# Patient Record
Sex: FEMALE | Race: BLACK OR AFRICAN AMERICAN | NOT HISPANIC OR LATINO
[De-identification: names, ages, dates, MRNs, and addresses within clinical notes are randomized per-mention and may not be internally consistent; named-entity substitution may affect disease eponyms.]

---

## 2023-10-25 DIAGNOSIS — Z00.00 ENCOUNTER FOR GENERAL ADULT MEDICAL EXAMINATION W/OUT ABNORMAL FINDINGS: ICD-10-CM

## 2023-10-27 ENCOUNTER — APPOINTMENT (OUTPATIENT)
Dept: ORTHOPEDIC SURGERY | Facility: CLINIC | Age: 58
End: 2023-10-27
Payer: COMMERCIAL

## 2023-10-27 VITALS — HEIGHT: 60 IN | BODY MASS INDEX: 45.55 KG/M2 | WEIGHT: 232 LBS

## 2023-10-27 DIAGNOSIS — M17.0 BILATERAL PRIMARY OSTEOARTHRITIS OF KNEE: ICD-10-CM

## 2023-10-27 DIAGNOSIS — M16.0 BILATERAL PRIMARY OSTEOARTHRITIS OF HIP: ICD-10-CM

## 2023-10-27 PROCEDURE — 99204 OFFICE O/P NEW MOD 45 MIN: CPT

## 2023-10-27 PROCEDURE — 73522 X-RAY EXAM HIPS BI 3-4 VIEWS: CPT

## 2023-10-27 PROCEDURE — 73564 X-RAY EXAM KNEE 4 OR MORE: CPT | Mod: LT

## 2023-10-27 RX ORDER — MELOXICAM 15 MG/1
15 TABLET ORAL
Qty: 30 | Refills: 2 | Status: ACTIVE | COMMUNITY
Start: 2023-10-27 | End: 1900-01-01

## 2024-01-09 ENCOUNTER — APPOINTMENT (OUTPATIENT)
Dept: ORTHOPEDIC SURGERY | Facility: CLINIC | Age: 59
End: 2024-01-09
Payer: COMMERCIAL

## 2024-01-09 ENCOUNTER — NON-APPOINTMENT (OUTPATIENT)
Age: 59
End: 2024-01-09

## 2024-01-09 VITALS
BODY MASS INDEX: 35.63 KG/M2 | DIASTOLIC BLOOD PRESSURE: 76 MMHG | SYSTOLIC BLOOD PRESSURE: 108 MMHG | HEART RATE: 69 BPM | HEIGHT: 67 IN | WEIGHT: 227 LBS

## 2024-01-09 DIAGNOSIS — M16.11 UNILATERAL PRIMARY OSTEOARTHRITIS, RIGHT HIP: ICD-10-CM

## 2024-01-09 PROCEDURE — 99215 OFFICE O/P EST HI 40 MIN: CPT

## 2024-01-09 PROCEDURE — 73502 X-RAY EXAM HIP UNI 2-3 VIEWS: CPT | Mod: RT

## 2024-01-09 PROCEDURE — 73564 X-RAY EXAM KNEE 4 OR MORE: CPT | Mod: RT

## 2024-02-22 ASSESSMENT — HOOS JR
HOOS JR RAW SCORE: 16
HOOS JR RAW SCORE: 17
LYING IN BED (TURNING OVER, MAINTAINING HIP POSITION): SEVERE
SITTING: MODERATE
RISING FROM SITTING: MODERATE
BENDING TO THE FLOOR TO PICK UP OBJECT: EXTREME
LYING IN BED (TURNING OVER, MAINTAINING HIP POSITION): SEVERE
BENDING TO THE FLOOR TO PICK UP OBJECT: EXTREME
WALKING ON UNEVEN SURFACE: MODERATE
SITTING: MODERATE
GOING UP OR DOWN STAIRS: SEVERE
RISING FROM SITTING: MODERATE
WALKING ON UNEVEN SURFACE: SEVERE

## 2024-03-12 ENCOUNTER — OUTPATIENT (OUTPATIENT)
Dept: OUTPATIENT SERVICES | Facility: HOSPITAL | Age: 59
LOS: 1 days | End: 2024-03-12
Payer: COMMERCIAL

## 2024-03-12 VITALS
WEIGHT: 228.18 LBS | SYSTOLIC BLOOD PRESSURE: 110 MMHG | HEART RATE: 66 BPM | HEIGHT: 67 IN | OXYGEN SATURATION: 100 % | DIASTOLIC BLOOD PRESSURE: 68 MMHG | TEMPERATURE: 98 F

## 2024-03-12 DIAGNOSIS — Z90.711 ACQUIRED ABSENCE OF UTERUS WITH REMAINING CERVICAL STUMP: Chronic | ICD-10-CM

## 2024-03-12 DIAGNOSIS — Z98.890 OTHER SPECIFIED POSTPROCEDURAL STATES: Chronic | ICD-10-CM

## 2024-03-12 DIAGNOSIS — M16.11 UNILATERAL PRIMARY OSTEOARTHRITIS, RIGHT HIP: ICD-10-CM

## 2024-03-12 DIAGNOSIS — Z01.818 ENCOUNTER FOR OTHER PREPROCEDURAL EXAMINATION: ICD-10-CM

## 2024-03-12 LAB
A1C WITH ESTIMATED AVERAGE GLUCOSE RESULT: 4.6 % — SIGNIFICANT CHANGE UP (ref 4–5.6)
ALBUMIN SERPL ELPH-MCNC: 4.2 G/DL — SIGNIFICANT CHANGE UP (ref 3.3–5)
ALP SERPL-CCNC: 89 U/L — SIGNIFICANT CHANGE UP (ref 30–120)
ALT FLD-CCNC: 15 U/L — SIGNIFICANT CHANGE UP (ref 10–60)
ANION GAP SERPL CALC-SCNC: 9 MMOL/L — SIGNIFICANT CHANGE UP (ref 5–17)
APTT BLD: 34.4 SEC — SIGNIFICANT CHANGE UP (ref 24.5–35.6)
AST SERPL-CCNC: 13 U/L — SIGNIFICANT CHANGE UP (ref 10–40)
BILIRUB SERPL-MCNC: 1 MG/DL — SIGNIFICANT CHANGE UP (ref 0.2–1.2)
BLD GP AB SCN SERPL QL: SIGNIFICANT CHANGE UP
BUN SERPL-MCNC: 19 MG/DL — SIGNIFICANT CHANGE UP (ref 7–23)
CALCIUM SERPL-MCNC: 10.1 MG/DL — SIGNIFICANT CHANGE UP (ref 8.4–10.5)
CHLORIDE SERPL-SCNC: 104 MMOL/L — SIGNIFICANT CHANGE UP (ref 96–108)
CO2 SERPL-SCNC: 27 MMOL/L — SIGNIFICANT CHANGE UP (ref 22–31)
CREAT SERPL-MCNC: 0.87 MG/DL — SIGNIFICANT CHANGE UP (ref 0.5–1.3)
EGFR: 78 ML/MIN/1.73M2 — SIGNIFICANT CHANGE UP
ESTIMATED AVERAGE GLUCOSE: 85 MG/DL — SIGNIFICANT CHANGE UP (ref 68–114)
GLUCOSE SERPL-MCNC: 105 MG/DL — HIGH (ref 70–99)
HCT VFR BLD CALC: 42.2 % — SIGNIFICANT CHANGE UP (ref 34.5–45)
HGB BLD-MCNC: 13.5 G/DL — SIGNIFICANT CHANGE UP (ref 11.5–15.5)
INR BLD: 1.18 RATIO — SIGNIFICANT CHANGE UP (ref 0.85–1.18)
MCHC RBC-ENTMCNC: 29.1 PG — SIGNIFICANT CHANGE UP (ref 27–34)
MCHC RBC-ENTMCNC: 32 GM/DL — SIGNIFICANT CHANGE UP (ref 32–36)
MCV RBC AUTO: 90.9 FL — SIGNIFICANT CHANGE UP (ref 80–100)
MRSA PCR RESULT.: SIGNIFICANT CHANGE UP
NRBC # BLD: 0 /100 WBCS — SIGNIFICANT CHANGE UP (ref 0–0)
PLATELET # BLD AUTO: 236 K/UL — SIGNIFICANT CHANGE UP (ref 150–400)
POTASSIUM SERPL-MCNC: 4.5 MMOL/L — SIGNIFICANT CHANGE UP (ref 3.5–5.3)
POTASSIUM SERPL-SCNC: 4.5 MMOL/L — SIGNIFICANT CHANGE UP (ref 3.5–5.3)
PROT SERPL-MCNC: 8.5 G/DL — HIGH (ref 6–8.3)
PROTHROM AB SERPL-ACNC: 13.2 SEC — HIGH (ref 9.5–13)
RBC # BLD: 4.64 M/UL — SIGNIFICANT CHANGE UP (ref 3.8–5.2)
RBC # FLD: 11.7 % — SIGNIFICANT CHANGE UP (ref 10.3–14.5)
S AUREUS DNA NOSE QL NAA+PROBE: SIGNIFICANT CHANGE UP
SODIUM SERPL-SCNC: 140 MMOL/L — SIGNIFICANT CHANGE UP (ref 135–145)
WBC # BLD: 5.13 K/UL — SIGNIFICANT CHANGE UP (ref 3.8–10.5)
WBC # FLD AUTO: 5.13 K/UL — SIGNIFICANT CHANGE UP (ref 3.8–10.5)

## 2024-03-12 PROCEDURE — G0463: CPT

## 2024-03-12 NOTE — H&P PST ADULT - HISTORY OF PRESENT ILLNESS
59yo female patient with over a year history of progressively worsening right hip pain. She had one Cortisone injection without relief and PT. The pain radiates down to her knee and she has some burning sensation in the pre-tib area. She rates the pain at 7/10 and she is taking Aleve 2 tabs when pain is more severe. THR has been recommended and she presents today for PSTs.

## 2024-03-12 NOTE — H&P PST ADULT - NSICDXPROCEDURE_GEN_ALL_CORE_FT
PROCEDURES:  Total replacement of right hip joint by anterior approach 12-Mar-2024 10:49:39  Ivania Bowden

## 2024-03-12 NOTE — H&P PST ADULT - NSICDXFAMILYHX_GEN_ALL_CORE_FT
FAMILY HISTORY:  Father  Still living? No  Family history of cirrhosis of liver, Age at diagnosis: Age Unknown

## 2024-03-12 NOTE — H&P PST ADULT - NSICDXPASTSURGICALHX_GEN_ALL_CORE_FT
PAST SURGICAL HISTORY:  S/P bunionectomy     S/P bunionectomy     S/P hernia repair     S/P partial hysterectomy

## 2024-03-12 NOTE — H&P PST ADULT - ANESTHESIA, PREVIOUS REACTION, PROFILE
PA is required for Bupropion SR 150mg    Cover My Meds [place]:  Maricruz Davila Key: [id number] - PA Case ID: [id number] no known family hx/none

## 2024-03-12 NOTE — H&P PST ADULT - ATTENDING COMMENTS
The discussion regarding options for nonoperative and operative management was introduced through a patient shared decision making protocol. The benefits of surgery versus the risks were discussed with the patient and family.  Risks of surgery include medical complications of anesthesia, DVT, pulmonary embolism, heart attack, stroke, death, hardware complications, need for revision surgery, infection, bleeding, need for transfusion, neurovascular injury including thigh numbness, dislocation, fracture, chronic pain, stiffness and scarring including heterotopic ossification, and limb length discrepancy requiring a shoe lift were addressed with the patient. They appeared to understand the ramifications of surgery and had ample time for questions, then consenting for a right total hip replacement.

## 2024-03-12 NOTE — H&P PST ADULT - NSANTHOSAYNRD_GEN_A_CORE
No. SIMRAN screening performed.  STOP BANG Legend: 0-2 = LOW Risk; 3-4 = INTERMEDIATE Risk; 5-8 = HIGH Risk

## 2024-03-12 NOTE — H&P PST ADULT - COMMENTS
Pt denies any personal or known family hx of DVT or PE Pt denies any personal or known family hx of DVT or PE  Intermittent palpitations, pt was given Metoprolol, but stopped it stating palpitations were more prevalent since starting it. She states Cardiologist ok'd stopping it.

## 2024-03-12 NOTE — H&P PST ADULT - ASSESSMENT
57yo female patient with osteoarthritis of right hip scheduled for RIGHT Total Hip Replacement surgery.

## 2024-03-12 NOTE — H&P PST ADULT - NSICDXPASTMEDICALHX_GEN_ALL_CORE_FT
PAST MEDICAL HISTORY:  2019 novel coronavirus disease (COVID-19)     Class 2 obesity with body mass index (BMI) of 35.0 to 35.9 in adult     Fibroid uterus     History of palpitations     Osteoarthritis

## 2024-03-12 NOTE — H&P PST ADULT - PROBLEM SELECTOR PLAN 1
RIGHT Direct Anterior Total Hip Replacement on 04/01/2024  Medical and Cardiac Clearances  NPO as per Anesthesia- no meds on AM of surgery.  Hold Aleve starting on 3/25. Tylenol prn for pain  Instructions reviewed and questions addressed  Pt denies any metal allergies

## 2024-03-21 ENCOUNTER — NON-APPOINTMENT (OUTPATIENT)
Age: 59
End: 2024-03-21

## 2024-03-25 PROBLEM — U07.1 COVID-19: Chronic | Status: ACTIVE | Noted: 2024-03-12

## 2024-03-25 PROBLEM — Z87.898 PERSONAL HISTORY OF OTHER SPECIFIED CONDITIONS: Chronic | Status: ACTIVE | Noted: 2024-03-12

## 2024-03-25 PROBLEM — E66.9 OBESITY, UNSPECIFIED: Chronic | Status: ACTIVE | Noted: 2024-03-12

## 2024-03-25 PROBLEM — D25.9 LEIOMYOMA OF UTERUS, UNSPECIFIED: Chronic | Status: ACTIVE | Noted: 2024-03-12

## 2024-03-25 PROBLEM — M19.90 UNSPECIFIED OSTEOARTHRITIS, UNSPECIFIED SITE: Chronic | Status: ACTIVE | Noted: 2024-03-12

## 2024-03-31 ENCOUNTER — TRANSCRIPTION ENCOUNTER (OUTPATIENT)
Age: 59
End: 2024-03-31

## 2024-04-01 ENCOUNTER — RESULT REVIEW (OUTPATIENT)
Age: 59
End: 2024-04-01

## 2024-04-01 ENCOUNTER — TRANSCRIPTION ENCOUNTER (OUTPATIENT)
Age: 59
End: 2024-04-01

## 2024-04-01 ENCOUNTER — APPOINTMENT (OUTPATIENT)
Dept: ORTHOPEDIC SURGERY | Facility: HOSPITAL | Age: 59
End: 2024-04-01

## 2024-04-01 ENCOUNTER — OUTPATIENT (OUTPATIENT)
Dept: OUTPATIENT SERVICES | Facility: HOSPITAL | Age: 59
LOS: 1 days | End: 2024-04-01
Payer: COMMERCIAL

## 2024-04-01 VITALS
OXYGEN SATURATION: 97 % | SYSTOLIC BLOOD PRESSURE: 116 MMHG | WEIGHT: 229.5 LBS | RESPIRATION RATE: 22 BRPM | DIASTOLIC BLOOD PRESSURE: 59 MMHG | HEIGHT: 67 IN | TEMPERATURE: 98 F | HEART RATE: 62 BPM

## 2024-04-01 DIAGNOSIS — Z98.890 OTHER SPECIFIED POSTPROCEDURAL STATES: Chronic | ICD-10-CM

## 2024-04-01 DIAGNOSIS — Z90.711 ACQUIRED ABSENCE OF UTERUS WITH REMAINING CERVICAL STUMP: Chronic | ICD-10-CM

## 2024-04-01 DIAGNOSIS — M16.11 UNILATERAL PRIMARY OSTEOARTHRITIS, RIGHT HIP: ICD-10-CM

## 2024-04-01 LAB
ABO RH CONFIRMATION: SIGNIFICANT CHANGE UP
INR BLD: 1.18 RATIO — SIGNIFICANT CHANGE UP (ref 0.85–1.18)
PROTHROM AB SERPL-ACNC: 12.8 SEC — SIGNIFICANT CHANGE UP (ref 9.5–13)

## 2024-04-01 PROCEDURE — 27130 TOTAL HIP ARTHROPLASTY: CPT | Mod: RT

## 2024-04-01 PROCEDURE — 88305 TISSUE EXAM BY PATHOLOGIST: CPT | Mod: 26

## 2024-04-01 PROCEDURE — 88311 DECALCIFY TISSUE: CPT | Mod: 26

## 2024-04-01 PROCEDURE — 99222 1ST HOSP IP/OBS MODERATE 55: CPT

## 2024-04-01 PROCEDURE — 27130 TOTAL HIP ARTHROPLASTY: CPT | Mod: AS,RT

## 2024-04-01 DEVICE — SCREW ACET CANC PINN 6.5X20MM: Type: IMPLANTABLE DEVICE | Status: FUNCTIONAL

## 2024-04-01 DEVICE — LINER POLY ALTRX NEUT 40X56MM: Type: IMPLANTABLE DEVICE | Status: FUNCTIONAL

## 2024-04-01 DEVICE — IMPLANTABLE DEVICE: Type: IMPLANTABLE DEVICE | Status: FUNCTIONAL

## 2024-04-01 DEVICE — STEM FEM ACTIS TAPR STD COLLAR 12/14 SZ 7: Type: IMPLANTABLE DEVICE | Status: FUNCTIONAL

## 2024-04-01 DEVICE — HEAD FEM DELTA TS CERAMIC 12/14 40MM PLUS 5MM: Type: IMPLANTABLE DEVICE | Status: FUNCTIONAL

## 2024-04-01 DEVICE — SCREW CANN BONE: Type: IMPLANTABLE DEVICE | Status: FUNCTIONAL

## 2024-04-01 RX ORDER — ONDANSETRON 8 MG/1
4 TABLET, FILM COATED ORAL EVERY 6 HOURS
Refills: 0 | Status: DISCONTINUED | OUTPATIENT
Start: 2024-04-01 | End: 2024-04-15

## 2024-04-01 RX ORDER — TRANEXAMIC ACID 100 MG/ML
1000 INJECTION, SOLUTION INTRAVENOUS ONCE
Refills: 0 | Status: COMPLETED | OUTPATIENT
Start: 2024-04-01 | End: 2024-04-01

## 2024-04-01 RX ORDER — SODIUM CHLORIDE 9 MG/ML
1000 INJECTION, SOLUTION INTRAVENOUS ONCE
Refills: 0 | Status: COMPLETED | OUTPATIENT
Start: 2024-04-01 | End: 2024-04-01

## 2024-04-01 RX ORDER — OMEPRAZOLE 10 MG/1
1 CAPSULE, DELAYED RELEASE ORAL
Qty: 30 | Refills: 1
Start: 2024-04-01

## 2024-04-01 RX ORDER — SODIUM CHLORIDE 9 MG/ML
500 INJECTION INTRAMUSCULAR; INTRAVENOUS; SUBCUTANEOUS ONCE
Refills: 0 | Status: COMPLETED | OUTPATIENT
Start: 2024-04-01 | End: 2024-04-01

## 2024-04-01 RX ORDER — OXYCODONE HYDROCHLORIDE 5 MG/1
10 TABLET ORAL
Refills: 0 | Status: DISCONTINUED | OUTPATIENT
Start: 2024-04-01 | End: 2024-04-01

## 2024-04-01 RX ORDER — MAGNESIUM HYDROXIDE 400 MG/1
30 TABLET, CHEWABLE ORAL DAILY
Refills: 0 | Status: DISCONTINUED | OUTPATIENT
Start: 2024-04-01 | End: 2024-04-15

## 2024-04-01 RX ORDER — HYDROMORPHONE HYDROCHLORIDE 2 MG/ML
0.5 INJECTION INTRAMUSCULAR; INTRAVENOUS; SUBCUTANEOUS
Refills: 0 | Status: DISCONTINUED | OUTPATIENT
Start: 2024-04-01 | End: 2024-04-01

## 2024-04-01 RX ORDER — CEFAZOLIN SODIUM 1 G
2000 VIAL (EA) INJECTION ONCE
Refills: 0 | Status: COMPLETED | OUTPATIENT
Start: 2024-04-01 | End: 2024-04-01

## 2024-04-01 RX ORDER — PANTOPRAZOLE SODIUM 20 MG/1
40 TABLET, DELAYED RELEASE ORAL
Refills: 0 | Status: DISCONTINUED | OUTPATIENT
Start: 2024-04-01 | End: 2024-04-15

## 2024-04-01 RX ORDER — OXYCODONE HYDROCHLORIDE 5 MG/1
5 TABLET ORAL
Refills: 0 | Status: DISCONTINUED | OUTPATIENT
Start: 2024-04-01 | End: 2024-04-01

## 2024-04-01 RX ORDER — HYDROMORPHONE HYDROCHLORIDE 2 MG/ML
0.5 INJECTION INTRAMUSCULAR; INTRAVENOUS; SUBCUTANEOUS
Refills: 0 | Status: DISCONTINUED | OUTPATIENT
Start: 2024-04-01 | End: 2024-04-08

## 2024-04-01 RX ORDER — SODIUM CHLORIDE 9 MG/ML
1000 INJECTION, SOLUTION INTRAVENOUS
Refills: 0 | Status: DISCONTINUED | OUTPATIENT
Start: 2024-04-01 | End: 2024-04-01

## 2024-04-01 RX ORDER — ASPIRIN/CALCIUM CARB/MAGNESIUM 324 MG
81 TABLET ORAL EVERY 12 HOURS
Refills: 0 | Status: DISCONTINUED | OUTPATIENT
Start: 2024-04-02 | End: 2024-04-15

## 2024-04-01 RX ORDER — DEXAMETHASONE 0.5 MG/5ML
8 ELIXIR ORAL ONCE
Refills: 0 | Status: COMPLETED | OUTPATIENT
Start: 2024-04-02 | End: 2024-04-02

## 2024-04-01 RX ORDER — ONDANSETRON 8 MG/1
4 TABLET, FILM COATED ORAL ONCE
Refills: 0 | Status: DISCONTINUED | OUTPATIENT
Start: 2024-04-01 | End: 2024-04-01

## 2024-04-01 RX ORDER — APREPITANT 80 MG/1
40 CAPSULE ORAL ONCE
Refills: 0 | Status: COMPLETED | OUTPATIENT
Start: 2024-04-01 | End: 2024-04-01

## 2024-04-01 RX ORDER — POLYETHYLENE GLYCOL 3350 17 G/17G
17 POWDER, FOR SOLUTION ORAL AT BEDTIME
Refills: 0 | Status: DISCONTINUED | OUTPATIENT
Start: 2024-04-01 | End: 2024-04-15

## 2024-04-01 RX ORDER — SENNA PLUS 8.6 MG/1
2 TABLET ORAL AT BEDTIME
Refills: 0 | Status: DISCONTINUED | OUTPATIENT
Start: 2024-04-01 | End: 2024-04-15

## 2024-04-01 RX ORDER — ACETAMINOPHEN 500 MG
1000 TABLET ORAL ONCE
Refills: 0 | Status: COMPLETED | OUTPATIENT
Start: 2024-04-01 | End: 2024-04-01

## 2024-04-01 RX ORDER — CELECOXIB 200 MG/1
200 CAPSULE ORAL EVERY 12 HOURS
Refills: 0 | Status: DISCONTINUED | OUTPATIENT
Start: 2024-04-01 | End: 2024-04-15

## 2024-04-01 RX ORDER — CELECOXIB 200 MG/1
1 CAPSULE ORAL
Qty: 60 | Refills: 0
Start: 2024-04-01

## 2024-04-01 RX ORDER — CEFAZOLIN SODIUM 1 G
2000 VIAL (EA) INJECTION EVERY 8 HOURS
Refills: 0 | Status: COMPLETED | OUTPATIENT
Start: 2024-04-01 | End: 2024-04-02

## 2024-04-01 RX ORDER — CHLORHEXIDINE GLUCONATE 213 G/1000ML
1 SOLUTION TOPICAL ONCE
Refills: 0 | Status: COMPLETED | OUTPATIENT
Start: 2024-04-01 | End: 2024-04-01

## 2024-04-01 RX ORDER — SODIUM CHLORIDE 9 MG/ML
1000 INJECTION, SOLUTION INTRAVENOUS
Refills: 0 | Status: DISCONTINUED | OUTPATIENT
Start: 2024-04-02 | End: 2024-04-15

## 2024-04-01 RX ORDER — HYDROMORPHONE HYDROCHLORIDE 2 MG/ML
0.2 INJECTION INTRAMUSCULAR; INTRAVENOUS; SUBCUTANEOUS
Refills: 0 | Status: DISCONTINUED | OUTPATIENT
Start: 2024-04-01 | End: 2024-04-01

## 2024-04-01 RX ORDER — OXYCODONE HYDROCHLORIDE 5 MG/1
5 TABLET ORAL ONCE
Refills: 0 | Status: DISCONTINUED | OUTPATIENT
Start: 2024-04-01 | End: 2024-04-01

## 2024-04-01 RX ORDER — ACETAMINOPHEN 500 MG
1000 TABLET ORAL EVERY 6 HOURS
Refills: 0 | Status: COMPLETED | OUTPATIENT
Start: 2024-04-01 | End: 2024-04-02

## 2024-04-01 RX ORDER — ACETAMINOPHEN 500 MG
1000 TABLET ORAL EVERY 8 HOURS
Refills: 0 | Status: DISCONTINUED | OUTPATIENT
Start: 2024-04-02 | End: 2024-04-15

## 2024-04-01 RX ORDER — ASPIRIN/CALCIUM CARB/MAGNESIUM 324 MG
1 TABLET ORAL
Qty: 56 | Refills: 0
Start: 2024-04-01 | End: 2024-04-28

## 2024-04-01 RX ADMIN — SODIUM CHLORIDE 500 MILLILITER(S): 9 INJECTION INTRAMUSCULAR; INTRAVENOUS; SUBCUTANEOUS at 20:02

## 2024-04-01 RX ADMIN — CELECOXIB 200 MILLIGRAM(S): 200 CAPSULE ORAL at 21:49

## 2024-04-01 RX ADMIN — HYDROMORPHONE HYDROCHLORIDE 0.5 MILLIGRAM(S): 2 INJECTION INTRAMUSCULAR; INTRAVENOUS; SUBCUTANEOUS at 15:24

## 2024-04-01 RX ADMIN — Medication 400 MILLIGRAM(S): at 17:44

## 2024-04-01 RX ADMIN — HYDROMORPHONE HYDROCHLORIDE 0.5 MILLIGRAM(S): 2 INJECTION INTRAMUSCULAR; INTRAVENOUS; SUBCUTANEOUS at 15:40

## 2024-04-01 RX ADMIN — CELECOXIB 200 MILLIGRAM(S): 200 CAPSULE ORAL at 22:08

## 2024-04-01 RX ADMIN — APREPITANT 40 MILLIGRAM(S): 80 CAPSULE ORAL at 10:30

## 2024-04-01 RX ADMIN — SODIUM CHLORIDE 2000 MILLILITER(S): 9 INJECTION, SOLUTION INTRAVENOUS at 22:18

## 2024-04-01 RX ADMIN — SODIUM CHLORIDE 500 MILLILITER(S): 9 INJECTION INTRAMUSCULAR; INTRAVENOUS; SUBCUTANEOUS at 15:14

## 2024-04-01 RX ADMIN — CHLORHEXIDINE GLUCONATE 1 APPLICATION(S): 213 SOLUTION TOPICAL at 10:37

## 2024-04-01 RX ADMIN — SENNA PLUS 2 TABLET(S): 8.6 TABLET ORAL at 21:49

## 2024-04-01 RX ADMIN — Medication 100 MILLIGRAM(S): at 19:20

## 2024-04-01 RX ADMIN — SODIUM CHLORIDE 80 MILLILITER(S): 9 INJECTION, SOLUTION INTRAVENOUS at 15:14

## 2024-04-01 NOTE — BRIEF OPERATIVE NOTE - NSICDXBRIEFPROCEDURE_GEN_ALL_CORE_FT
PROCEDURES:  Total replacement of right hip joint by anterior approach 01-Apr-2024 14:35:31  Joon Ren

## 2024-04-01 NOTE — CONSULT NOTE ADULT - SUBJECTIVE AND OBJECTIVE BOX
57yo female patient with over a year history of progressively worsening right hip pain. She had one Cortisone injection without relief and PT. The pain radiates down to her knee and she has some burning sensation in the pre-tib area. She rates the pain at 7/10 and she is taking Aleve 2 tabs when pain is more severe. THR has been recommended and she presents today for PSTs.     Allergies/Medications:   	No Known Allergies:      Past Medical, Past Surgical, and Family History:  PAST MEDICAL HISTORY:  2019 novel coronavirus disease (COVID-19)     Class 2 obesity with body mass index (BMI) of 35.0 to 35.9 in adult     Fibroid uterus     History of palpitations     Osteoarthritis.     PAST SURGICAL HISTORY:  S/P bunionectomy     S/P bunionectomy     S/P hernia repair     S/P partial hysterectomy.       Medications/Review:   Home Medications:   * Patient Currently Takes Medications as of 12-Mar-2024 11:16 documented in Structured Notes  · 	Aleve Liquid Gels 220 mg oral capsule: Last Dose Taken:  , 2 cap(s) orally every 8 hours as needed for  moderate pain    Review of Systems:   · General	negative  · Skin/Breast	negative  · Ophthalmologic	negative  · Negative ENMT Symptoms	no hearing difficulty  · ENMT Comments	nothing loose or removable in mouth  · Respiratory and Thorax	negative  · Cardiovascular Symptoms	palpitations; occasional  · Gastrointestinal	negative  · Genitourinary	negative  · Musculoskeletal Symptoms	arthritis; joint pain  · Musculoskeletal Comments	right hip pain, bilateral knee pain  · Neurological Symptoms	burning right pre-tib area  · Psychiatric	negative    Physical Exam:    Physical Exam:  · Constitutional	well-groomed; no distress; obese  · Eyes	PERRL; conjunctiva clear  · ENMT	no gross abnormalities  · Respiratory	clear to auscultation bilaterally; no wheezes; no rales; no rhonchi  · Cardiovascular	regular rate and rhythm; S1 S2 present; no gallops; no rub; no murmur  · Gastrointestinal	soft; nontend

## 2024-04-01 NOTE — DISCHARGE NOTE PROVIDER - NSDCFUADDINST_GEN_ALL_CORE_FT
For Constipation :   • Increase your water intake. Drink at least 8 glasses of water daily.  • Try adding fiber to your diet by eating fruits, vegetables and foods that are rich in grains.  • If you do experience constipation, you may take an over-the-counter stool softener/laxative such as  Colace, Senokot or  Milk of Magnesia.

## 2024-04-01 NOTE — OCCUPATIONAL THERAPY INITIAL EVALUATION ADULT - LIVES WITH, PROFILE
Pt lives in an apartment, 3+10 steps to enter with a tub. Pt was independent with ADLs, IADLs, functional mobility/transfers prior to admission without AD. Pt reports her friend, sister, and daughter will be available to assist her upon discharge. Pt owns a RW, cane and commode.

## 2024-04-01 NOTE — DISCHARGE NOTE PROVIDER - NSDCMRMEDTOKEN_GEN_ALL_CORE_FT
Aleve Liquid Gels 220 mg oral capsule: 2 cap(s) orally every 8 hours as needed for  moderate pain  aspirin 81 mg oral delayed release tablet: 1 tab(s) orally every 12 hours Take 2 hours before Celebrex  CeleBREX 200 mg oral capsule: 1 cap(s) orally every 12 hours take 2 hours after Aspirin  omeprazole 20 mg oral delayed release capsule: 1 cap(s) orally once a day   acetaminophen 500 mg oral tablet: 2 tab(s) orally every 8 hours  aspirin 81 mg oral delayed release tablet: 1 tab(s) orally every 12 hours Take 2 hours before Celebrex  CeleBREX 200 mg oral capsule: 1 cap(s) orally every 12 hours take 2 hours after Aspirin  omeprazole 20 mg oral delayed release capsule: 1 cap(s) orally once a day  oxyCODONE 5 mg oral tablet: 1 tab(s) orally every 4 to 8 hours as needed for Moderate Pain (4 - 6) as needed for moderate to severe pain MDD: 4

## 2024-04-01 NOTE — PHYSICAL THERAPY INITIAL EVALUATION ADULT - ACTIVE RANGE OF MOTION EXAMINATION, REHAB EVAL
right ankle foot knee WNL. Right hip NT due to sx/hilda. upper extremity Active ROM was WNL (within normal limits)/LLE Active ROM was WNL (within normal limits)/deficits as listed below

## 2024-04-01 NOTE — PHYSICAL THERAPY INITIAL EVALUATION ADULT - MD ORDER
WBAT right LE. anterior hip precautions:No hyperextension,no extreme external rotation, no extreme abduction. OOB. PT

## 2024-04-01 NOTE — PHYSICAL THERAPY INITIAL EVALUATION ADULT - LEVEL OF INDEPENDENCE: SIT/STAND, REHAB EVAL
Pre-Visit Chart Review  For Appointment Scheduled on 08/27/2018    Health Maintenance Due   Topic Date Due    TETANUS VACCINE  01/24/1967    Colonoscopy  01/24/1999    Zoster Vaccine  01/24/2009    Pneumococcal (65+) (1 of 2 - PCV13) 01/24/2014    Influenza Vaccine  08/01/2018                     
minimum assist (75% patients effort)

## 2024-04-01 NOTE — PHYSICAL THERAPY INITIAL EVALUATION ADULT - ADDITIONAL COMMENTS
Pt lives in house with 3 + 10  steps to enter with handrail, 0 steps inside. Owns DME.RW,cane,commode Has tub shower. Pt's friend and family will assist at home upon d/c.

## 2024-04-01 NOTE — DISCHARGE NOTE PROVIDER - NSDCCPTREATMENT_GEN_ALL_CORE_FT
PRINCIPAL PROCEDURE  Procedure: Replacement, hip, anterior approach  Findings and Treatment: PT/OT Anterior Total Hip Protocol; Isometrics, Ambulation, transfers, stairs, & ADLs  Full weight bearing both legs; Walker/cane use as instructed by PT/OT  Anterior THR precautions for 6 weeks: No straight leg raise; No external rotation of hip when extended-standing or lying flat; No hyperextension of hip when standing (kickback)  Ice packs to hip for 30 min. every 3 hours   Staple removal on/near after Post Op Day # 14 in Surgeon's office.  No tub baths  Do not resume driving until you have your surgeons permission  Instruct patient to see PCP in office near 2-3 weeks from discharge from rehab for exam/labwork.   Silverlon dressing is waterproof.  You may shower, but do not aim shower stream at surgical site. Pat dry after shower.   Remove Silverlon dressing on postop day #7. May shower after Silverlon removal.

## 2024-04-01 NOTE — PHYSICAL THERAPY INITIAL EVALUATION ADULT - PRECAUTIONS/LIMITATIONS, REHAB EVAL
anterior hip precautions:No hyperextension,no extreme external rotation, no extreme abduction./fall precautions/right hip precautions

## 2024-04-01 NOTE — DISCHARGE NOTE PROVIDER - NSDCFUSCHEDAPPT_GEN_ALL_CORE_FT
Joe Banks  [facility] Physician [facility]  ORTHOSURG [address] [facility]  Scheduled Appointment: 04/01/2024    Joe Banks  [facility] PreAdmits.  Scheduled Appointment: 04/02/2024    Joe Banks  [facility]  ORTHOSURG [address]  Scheduled Appointment: 04/23/2024     Joe Banks  [facility] PreAdmits.  Scheduled Appointment: 04/02/2024    Joe Banks  [facility]  ORTHOSUR[address]  Scheduled Appointment: 04/23/2024

## 2024-04-01 NOTE — CONSULT NOTE ADULT - ASSESSMENT
57yo female patient with osteoarthritis of right hip scheduled for RIGHT Total Hip Replacement surgery.         s/p right total hip replacement day 0  pt/ot  pain control  DVT prophalxis  incentive spirometer       time spent 47 minutes

## 2024-04-01 NOTE — DISCHARGE NOTE PROVIDER - NSDCCPCAREPLAN_GEN_ALL_CORE_FT
PRINCIPAL DISCHARGE DIAGNOSIS  Diagnosis: Degenerative joint disease (DJD) of hip  Assessment and Plan of Treatment: - Call your doctor if you experience:  • An increase in pain not controlled by pain medication or change in activity or  position.  • Temperature greater than 101° F.  • Redness, increased swelling or foul smelling drainage from or around the  incision.  • Numbness, tingling or a change in color or temperature of the operative extremity.  • Call your doctor immediately if you experience chest pain, shortness of breath or calf pain.

## 2024-04-01 NOTE — DISCHARGE NOTE PROVIDER - PROVIDER TOKENS
PROVIDER:[TOKEN:[[id number]:[facility]:[id number]],SCHEDULEDAPPT:[04/23/2024],SCHEDULEDAPPTTIME:[10:00 AM]]

## 2024-04-01 NOTE — DISCHARGE NOTE PROVIDER - CARE PROVIDER_API CALL
KILEY PANDA  [address], [address]  [place] [address]  Phone: [phone]  Fax: ([phone]  Scheduled Appointment: 04/23/2024 10:00 AM

## 2024-04-02 ENCOUNTER — TRANSCRIPTION ENCOUNTER (OUTPATIENT)
Age: 59
End: 2024-04-02

## 2024-04-02 VITALS
DIASTOLIC BLOOD PRESSURE: 58 MMHG | OXYGEN SATURATION: 98 % | HEART RATE: 62 BPM | RESPIRATION RATE: 18 BRPM | SYSTOLIC BLOOD PRESSURE: 102 MMHG

## 2024-04-02 LAB
ANION GAP SERPL CALC-SCNC: 12 MMOL/L — SIGNIFICANT CHANGE UP (ref 5–17)
BUN SERPL-MCNC: 9 MG/DL — SIGNIFICANT CHANGE UP (ref 7–23)
CALCIUM SERPL-MCNC: 9.1 MG/DL — SIGNIFICANT CHANGE UP (ref 8.4–10.5)
CHLORIDE SERPL-SCNC: 108 MMOL/L — SIGNIFICANT CHANGE UP (ref 96–108)
CO2 SERPL-SCNC: 22 MMOL/L — SIGNIFICANT CHANGE UP (ref 22–31)
CREAT SERPL-MCNC: 0.67 MG/DL — SIGNIFICANT CHANGE UP (ref 0.5–1.3)
EGFR: 103 ML/MIN/1.73M2 — SIGNIFICANT CHANGE UP
GLUCOSE SERPL-MCNC: 120 MG/DL — HIGH (ref 70–99)
HCT VFR BLD CALC: 32.1 % — LOW (ref 34.5–45)
HGB BLD-MCNC: 10.5 G/DL — LOW (ref 11.5–15.5)
MCHC RBC-ENTMCNC: 29.7 PG — SIGNIFICANT CHANGE UP (ref 27–34)
MCHC RBC-ENTMCNC: 32.7 GM/DL — SIGNIFICANT CHANGE UP (ref 32–36)
MCV RBC AUTO: 90.7 FL — SIGNIFICANT CHANGE UP (ref 80–100)
NRBC # BLD: 0 /100 WBCS — SIGNIFICANT CHANGE UP (ref 0–0)
PLATELET # BLD AUTO: 210 K/UL — SIGNIFICANT CHANGE UP (ref 150–400)
POTASSIUM SERPL-MCNC: 4.7 MMOL/L — SIGNIFICANT CHANGE UP (ref 3.5–5.3)
POTASSIUM SERPL-SCNC: 4.7 MMOL/L — SIGNIFICANT CHANGE UP (ref 3.5–5.3)
RBC # BLD: 3.54 M/UL — LOW (ref 3.8–5.2)
RBC # FLD: 11.6 % — SIGNIFICANT CHANGE UP (ref 10.3–14.5)
SODIUM SERPL-SCNC: 142 MMOL/L — SIGNIFICANT CHANGE UP (ref 135–145)
WBC # BLD: 13.82 K/UL — HIGH (ref 3.8–10.5)
WBC # FLD AUTO: 13.82 K/UL — HIGH (ref 3.8–10.5)

## 2024-04-02 PROCEDURE — 85610 PROTHROMBIN TIME: CPT

## 2024-04-02 PROCEDURE — 76000 FLUOROSCOPY <1 HR PHYS/QHP: CPT

## 2024-04-02 PROCEDURE — 97535 SELF CARE MNGMENT TRAINING: CPT

## 2024-04-02 PROCEDURE — 88305 TISSUE EXAM BY PATHOLOGIST: CPT

## 2024-04-02 PROCEDURE — 80048 BASIC METABOLIC PNL TOTAL CA: CPT

## 2024-04-02 PROCEDURE — C1776: CPT

## 2024-04-02 PROCEDURE — 97165 OT EVAL LOW COMPLEX 30 MIN: CPT

## 2024-04-02 PROCEDURE — 85027 COMPLETE CBC AUTOMATED: CPT

## 2024-04-02 PROCEDURE — 97110 THERAPEUTIC EXERCISES: CPT

## 2024-04-02 PROCEDURE — 36415 COLL VENOUS BLD VENIPUNCTURE: CPT

## 2024-04-02 PROCEDURE — 97161 PT EVAL LOW COMPLEX 20 MIN: CPT

## 2024-04-02 PROCEDURE — 88311 DECALCIFY TISSUE: CPT

## 2024-04-02 PROCEDURE — C1713: CPT

## 2024-04-02 PROCEDURE — 27130 TOTAL HIP ARTHROPLASTY: CPT | Mod: RT

## 2024-04-02 PROCEDURE — 97116 GAIT TRAINING THERAPY: CPT

## 2024-04-02 PROCEDURE — 99233 SBSQ HOSP IP/OBS HIGH 50: CPT

## 2024-04-02 PROCEDURE — 97530 THERAPEUTIC ACTIVITIES: CPT

## 2024-04-02 RX ORDER — ACETAMINOPHEN 500 MG
2 TABLET ORAL
Qty: 0 | Refills: 0 | DISCHARGE
Start: 2024-04-02

## 2024-04-02 RX ORDER — OXYCODONE HYDROCHLORIDE 5 MG/1
1 TABLET ORAL
Qty: 30 | Refills: 0
Start: 2024-04-02

## 2024-04-02 RX ADMIN — CELECOXIB 200 MILLIGRAM(S): 200 CAPSULE ORAL at 08:50

## 2024-04-02 RX ADMIN — Medication 1000 MILLIGRAM(S): at 05:55

## 2024-04-02 RX ADMIN — Medication 400 MILLIGRAM(S): at 01:03

## 2024-04-02 RX ADMIN — Medication 101.6 MILLIGRAM(S): at 05:16

## 2024-04-02 RX ADMIN — CELECOXIB 200 MILLIGRAM(S): 200 CAPSULE ORAL at 08:48

## 2024-04-02 RX ADMIN — Medication 1000 MILLIGRAM(S): at 14:00

## 2024-04-02 RX ADMIN — Medication 100 MILLIGRAM(S): at 03:52

## 2024-04-02 RX ADMIN — Medication 81 MILLIGRAM(S): at 05:16

## 2024-04-02 RX ADMIN — PANTOPRAZOLE SODIUM 40 MILLIGRAM(S): 20 TABLET, DELAYED RELEASE ORAL at 05:16

## 2024-04-02 RX ADMIN — Medication 1000 MILLIGRAM(S): at 13:02

## 2024-04-02 RX ADMIN — Medication 1000 MILLIGRAM(S): at 01:12

## 2024-04-02 RX ADMIN — Medication 1000 MILLIGRAM(S): at 05:16

## 2024-04-02 NOTE — PROGRESS NOTE ADULT - SUBJECTIVE AND OBJECTIVE BOX
Discharge medication calendar:  ASA EC 81mg q12h x 4 weeks  Omeprazole 20mg QAM x 4 weeks  Celecoxib 200mg q12h x 2-3 weeks  APAP 1000mg q8h x 2-3 weeks  Narcotic PRN  Docusate 100mg TID while taking narcotic  Miralax, Senna, or Bisacodyl PRN for treatment of constipation  
Orthopedic Progress Note     Interval History:  No acute events overnight, pain is well controlled.  Patient denies any chest pain, SOB, N/V, fevers/chills.    Exam:  T(C): 36.8 (04-02-24 @ 03:34), Max: 36.8 (04-02-24 @ 03:34)  HR: 62 (04-02-24 @ 03:34) (53 - 76)  BP: 93/52 (04-02-24 @ 03:34) (87/49 - 116/59)  RR: 16 (04-02-24 @ 03:34) (12 - 24)  SpO2: 100% (04-02-24 @ 03:34) (97% - 100%)  Wt(kg): --I&O's Summary    01 Apr 2024 07:01  -  02 Apr 2024 06:34  --------------------------------------------------------  IN: 2650 mL / OUT: 400 mL / NET: 2250 mL        Lying in bed in no apparent distress  Unlabored respirations    EXT:   Dressing clean, dry and intact.   Compression stocking in place.   Sensation is intact in the superficial peroneal, deep peroneal, tibial, sural and saphenous nerve distributions  Able to dorsiflex and plantarflex ankle. Wiggles toes  Foot warm and well perfused  Sequential compression device on            Labs:           Assessment/ Plan: DANIEL MONTERROSO is postoperative day #1 s/p right total hip replacement. Overall doing well  - please ensure ice over operative site while in bed or chair.   -anterior hip precautions  - PT/ OT for rehabilitation. WBAT with ROM activities as tolerated  - multimodal pain regimen.   - IV surgical prophylactic antibiotics x 24 hours.   - chemical DVT prophylaxis per protocol  - SCDs while in bed. Out of bed to chair   - thigh high compression stockings. Keep on operative leg until postoperative appointment. Can remove compression stocking to shower/ bathe.  - incentive spirometry  - disposition planning. Call [phone] to confirm or change postoperative appointment.  - page orthopedics team with questions or concerns.
Subjective: Patient seen and examined. No overnight events. Doing well. Pain controlled. Worked with PT.     MEDICATIONS  (STANDING):  acetaminophen     Tablet .. 1000 milliGRAM(s) Oral every 8 hours  aspirin enteric coated 81 milliGRAM(s) Oral every 12 hours  celecoxib 200 milliGRAM(s) Oral every 12 hours  lactated ringers. 1000 milliLiter(s) (125 mL/Hr) IV Continuous <Continuous>  pantoprazole    Tablet 40 milliGRAM(s) Oral before breakfast  polyethylene glycol 3350 17 Gram(s) Oral at bedtime  senna 2 Tablet(s) Oral at bedtime    MEDICATIONS  (PRN):  HYDROmorphone  Injectable 0.5 milliGRAM(s) IV Push every 3 hours PRN Breakthrough pain  magnesium hydroxide Suspension 30 milliLiter(s) Oral daily PRN Constipation  ondansetron Injectable 4 milliGRAM(s) IV Push every 6 hours PRN Nausea and/or Vomiting  oxyCODONE    IR 10 milliGRAM(s) Oral every 3 hours PRN Severe Pain (7 - 10)  oxyCODONE    IR 5 milliGRAM(s) Oral every 3 hours PRN Moderate Pain (4 - 6)      Allergies    No Known Allergies    Intolerances        Vital Signs Last 24 Hrs  T(C): 36.6 (02 Apr 2024 07:58), Max: 36.8 (02 Apr 2024 03:34)  T(F): 97.8 (02 Apr 2024 07:58), Max: 98.2 (02 Apr 2024 03:34)  HR: 60 (02 Apr 2024 07:58) (53 - 76)  BP: 110/56 (02 Apr 2024 07:58) (87/49 - 116/59)  BP(mean): --  RR: 18 (02 Apr 2024 07:58) (12 - 24)  SpO2: 99% (02 Apr 2024 07:58) (97% - 100%)    Parameters below as of 02 Apr 2024 07:58  Patient On (Oxygen Delivery Method): room air        PHYSICAL EXAM:  GENERAL: NAD, well-groomed, well-developed  HEAD:  Atraumatic, Normocephalic  ENMT: Moist mucous membranes,   NECK: Supple, No JVD, Normal thyroid  NERVOUS SYSTEM:  All 4 extremities mobile, no gross sensory deficits.   CHEST/LUNG: Clear to auscultation bilaterally; No rales, rhonchi, wheezing, or rubs  HEART: Regular rate and rhythm; No murmurs, rubs, or gallops  ABDOMEN: Soft, Nontender, Nondistended; Bowel sounds present  EXTREMITIES:  2+ Peripheral Pulses, No clubbing, cyanosis, or edema      LABS:          PT/INR - ( 01 Apr 2024 10:39 )   PT: 12.8 sec;   INR: 1.18 ratio             CAPILLARY BLOOD GLUCOSE          RADIOLOGY & ADDITIONAL TESTS:    Imaging Personally Reviewed:  [ ] YES     Consultant(s) Notes Reviewed:      Care Discussed with Consultants/Other Providers:    Advanced Directives: [ ] DNR  [ ] No feeding tube  [ ] MOLST in chart  [ ] MOLST completed today  [ ] Unknown  
SUBJECTIVE: Patient seen and examined. No nausea/vomiting, nor shortness of breath. Patient with leg soreness over the right ankle and anterior thigh.     OBJECTIVE:     Vital Signs Last 24 Hrs  T(C): 36.8 (02 Apr 2024 03:34), Max: 36.8 (02 Apr 2024 03:34)  T(F): 98.2 (02 Apr 2024 03:34), Max: 98.2 (02 Apr 2024 03:34)  HR: 62 (02 Apr 2024 03:34) (53 - 76)  BP: 93/52 (02 Apr 2024 03:34) (87/49 - 116/59)  BP(mean): --  RR: 16 (02 Apr 2024 03:34) (12 - 24)  SpO2: 100% (02 Apr 2024 03:34) (97% - 100%)    Parameters below as of 02 Apr 2024 03:34  Patient On (Oxygen Delivery Method): room air        PAIN SCORE:   0      SCALE USED: (1-10 VNRS)        Affected extremity:          Dressing: clean/dry/intact  post operative silverlon. Supple right thigh not tense to palpation.          Sensation: intact to bilateral feet, calves are supple and nontender.          Motor exam:          5/ 5 Tibialis Anterior/Gastrocnemius-Soleus , EHL         warm well perfused; capillary refill <3 seconds     LABS: pending blood has been drawn          PT/INR - ( 01 Apr 2024 10:39 )   PT: 12.8 sec;   INR: 1.18 ratio        MEDICATIONS:    Anticoagulation:  aspirin enteric coated 81 milliGRAM(s) Oral every 12 hours      Antibiotics: finished      Pain medications:   acetaminophen     Tablet .. 1000 milliGRAM(s) Oral every 8 hours  celecoxib 200 milliGRAM(s) Oral every 12 hours  HYDROmorphone  Injectable 0.5 milliGRAM(s) IV Push every 3 hours PRN  ondansetron Injectable 4 milliGRAM(s) IV Push every 6 hours PRN  oxyCODONE    IR 10 milliGRAM(s) Oral every 3 hours PRN  oxyCODONE    IR 5 milliGRAM(s) Oral every 3 hours PRN      A/P :  s/p Right THR   POD # 1  -    Pain control  -    DVT ppx: ASA   -    Check AM labs  -    Weight bearing status: WBAT   -    Physical Therapy  -    Dispo: Home pending clearance by medicine and physical therapy.

## 2024-04-02 NOTE — CARE COORDINATION ASSESSMENT. - NSPASTMEDSURGHISTORY_GEN_ALL_CORE_FT
PAST MEDICAL & SURGICAL HISTORY:  Fibroid uterus      History of palpitations      Class 2 obesity with body mass index (BMI) of 35.0 to 35.9 in adult      Osteoarthritis      2019 novel coronavirus disease (COVID-19)      S/P hernia repair      S/P partial hysterectomy      S/P bunionectomy      S/P bunionectomy

## 2024-04-02 NOTE — DISCHARGE NOTE NURSING/CASE MANAGEMENT/SOCIAL WORK - NSDCPEFALRISK_GEN_ALL_CORE
For information on Fall & Injury Prevention, visit: [email or web address] OR  [email or web address] OR  https://www.cdc.gov/steadi/patient.html

## 2024-04-02 NOTE — DISCHARGE NOTE NURSING/CASE MANAGEMENT/SOCIAL WORK - PATIENT PORTAL LINK FT
You can access the FollowMyHealth Patient Portal offered by [facility] by registering at the following website: [email or web address]. By joining [facility]’s FollowMyHealth portal, you will also be able to view your health information using other applications (apps) compatible with our system. Self

## 2024-04-02 NOTE — CARE COORDINATION ASSESSMENT. - NSDCPLANSERVICES_GEN_ALL_CORE
CM met with patient at bedside.  Patient. A&O x 4. Pt s/p  PROCEDURES: Total right hip replacement .   Pt  resting comfortably / Pt has no complaints at this time.  CM explained role of CM and availability to assist with discharge planning throughout stay.   Provided CM contact information and pt. verbalized understanding. Patient lives in a private house, 3+10 steps with HR to navigate. Prior to surgery patient was ind in adls. Patient identified her daughter Kalyn  as her caregiver at home who will assist her during her recuperation and will transport her home and to MD appointments.   Pt. is agreeable with PT/OT's recommendations for d/c plan to home with HC/PT.  CM explained home care expectations, process, insurance provisions and home safety with good understanding.   Offered list of [facility] and pt. chose [facility] at home care agency.  Provided discharge resources folder. CM made referral accordingly.  Informed them of Anticipated  DC date for today 4/2/2024 and for SOC tomorrow 4/3/2024.  Pt verbalizing understanding and in agreement with d/c plans.  DME: Pt has a RW, commode at home.  PCP: Dr Russell Potter [phone]  Pt stated she will be receiving meds to bed from [facility] pharmacy prior to DC./Home Care

## 2024-04-02 NOTE — CARE COORDINATION ASSESSMENT. - NSCAREPROVIDERS_GEN_ALL_CORE_FT
CARE PROVIDERS:  Administration: Ryann Pablo  Administration: Josué Fatima  Admitting: Joe Banks  Attending: Joe Banks  Consultant: Wood Saunders  Infection Control: Fatoumata Velazquez  Nurse: Stephanie Marina  Nurse: Juana Scott  Nurse: Odilia Londono  Nurse: Rupal Avila  Occupational Therapy: Paty Tobar  PCA/Nursing Assistant: Hubert Blanco  Physical Therapy: Abby Bell  Physical Therapy: Brendon Moses  Primary Team: Joon Ren  Primary Team: Sujatha Rachel  Primary Team: David Paulino  Primary Team: Fatoumata Vidal  Primary Team: Jaime Bragg  Primary Team: Janet Rapp  Primary Team: Aspen Richmond  Registered Dietitian: Katherine Alexandra  : Nelly Orlando  Team: [facility] Hospitalists, Team

## 2024-04-02 NOTE — PROGRESS NOTE ADULT - ASSESSMENT
59yo female patient with osteoarthritis of right hip scheduled for RIGHT Total Hip Replacement surgery.         s/p right total hip replacement day 1  pt/ot  pain control  DVT prophalxis  incentive spirometer    Patient medically optimized for discharge home if cleared by PT and Ortho.       time spent 47 minutes

## 2024-04-02 NOTE — DISCHARGE NOTE NURSING/CASE MANAGEMENT/SOCIAL WORK - NSSCNAMETXT_GEN_ALL_CORE
[facility] care agency [phone] will reach out to you within 24-72 hours of your discharge to schedule home care visit/eval appointment with you. Please call agency for any queries regarding home care services

## 2024-04-03 NOTE — PHARMACOTHERAPY INTERVENTION NOTE - COMMENTS
Transition of Care video discharge education - medication calendar given to patient  Nietsh Barriga PharmD Candidate for Gayle Sanchez, PharmD 
Admission medication reconciliation POD1

## 2024-04-04 LAB — SURGICAL PATHOLOGY STUDY: SIGNIFICANT CHANGE UP

## 2024-04-16 ENCOUNTER — APPOINTMENT (OUTPATIENT)
Dept: ORTHOPEDIC SURGERY | Facility: CLINIC | Age: 59
End: 2024-04-16
Payer: COMMERCIAL

## 2024-04-16 VITALS — HEART RATE: 67 BPM | SYSTOLIC BLOOD PRESSURE: 111 MMHG | DIASTOLIC BLOOD PRESSURE: 72 MMHG

## 2024-04-16 DIAGNOSIS — Z96.641 PRESENCE OF RIGHT ARTIFICIAL HIP JOINT: ICD-10-CM

## 2024-04-16 PROCEDURE — 73502 X-RAY EXAM HIP UNI 2-3 VIEWS: CPT | Mod: RT

## 2024-04-16 PROCEDURE — 99024 POSTOP FOLLOW-UP VISIT: CPT

## 2024-04-16 RX ORDER — AMOXICILLIN 500 MG/1
500 TABLET, FILM COATED ORAL
Qty: 24 | Refills: 0 | Status: ACTIVE | COMMUNITY
Start: 2024-04-16 | End: 1900-01-01

## 2024-04-16 NOTE — DISCUSSION/SUMMARY
[de-identified] : Now s/p right HENRIETTA on 4/1/20214for follow-up. Overall doing well.   At this point we have suggested continued exercises and formal physical therapy with Rx given. Appropriate instructions regarding further care, restrictions, and further recovery has been given. They know to complete the prescribed four weeks of DVT prophylaxis.   We have also counseled the patient to avoid any dental procedures for the first three months postoperatively. We remain available for any further questions and concerns and instructed patient that we would like to see them if any concerns for infection including fevers, redness, or increased swelling.

## 2024-04-16 NOTE — PHYSICAL EXAM
[de-identified] : General Appearance / Station: Well developed, well nourished, in no acute distress Orientation: Oriented to person, place, and time Gait & Station: Ambulates with assistive device Neurologic: Normal leg sensation Cardiovascular: Warm extremity Lymphatics: No lymphedema  OPERATIVE HIP Skin: incision clean, dry and intact. Well healing. Wound cleaned with ChloraPrep and staples removed.  Steri-Strips applied. No erythema, warmth or tenderness. Range of motion: Painless internal and external rotation of the hip. Strength: Within Normal Limits. Negative Trendelenburg sign                                                                      Neurovascular Exam: Able to wiggle toes and dorsiflex/ plantarflex ankle. Foot warm, well perfused        [de-identified] : Imaging: AP Pelvis and lateral views of the right hip show satisfactory placement of a right cementless total hip arthroplasty. There are no changes in alignment of hardware and no signs of radiographic failure compared to previous radiographs.

## 2024-04-16 NOTE — HISTORY OF PRESENT ILLNESS
[de-identified] : DANIEL MONTERROSO  is an 58 year-old female presents today status post right total hip arthroplasty on 4/1/2024 for followup. The patient is living at home. The patient has maintained weight bearing as tolerated and hip precautions. The patient is ambulating with a cane for an assistive device and working with physical therapy. The patient has weaned off all narcotic pain medicine. The patient is progressing well and is happy with the progress.   No fever, chills, or signs of infection. Today, patient does not state any other associated signs or complaints outside of those described. The patient presents for postoperative followup.

## 2024-04-18 ENCOUNTER — NON-APPOINTMENT (OUTPATIENT)
Age: 59
End: 2024-04-18

## 2024-05-06 ENCOUNTER — NON-APPOINTMENT (OUTPATIENT)
Age: 59
End: 2024-05-06

## 2024-05-06 ASSESSMENT — HOOS JR
WALKING ON UNEVEN SURFACE: MODERATE
RISING FROM SITTING: MODERATE
HOOS JR RAW SCORE: 6
GOING UP OR DOWN STAIRS: MODERATE

## 2024-05-16 ENCOUNTER — NON-APPOINTMENT (OUTPATIENT)
Age: 59
End: 2024-05-16

## 2024-05-28 ENCOUNTER — APPOINTMENT (OUTPATIENT)
Dept: ORTHOPEDIC SURGERY | Facility: CLINIC | Age: 59
End: 2024-05-28
Payer: COMMERCIAL

## 2024-05-28 VITALS — SYSTOLIC BLOOD PRESSURE: 116 MMHG | HEART RATE: 58 BPM | DIASTOLIC BLOOD PRESSURE: 80 MMHG

## 2024-05-28 PROCEDURE — 20610 DRAIN/INJ JOINT/BURSA W/O US: CPT | Mod: LT,58

## 2024-05-28 PROCEDURE — 99213 OFFICE O/P EST LOW 20 MIN: CPT | Mod: 25

## 2024-05-28 PROCEDURE — 73502 X-RAY EXAM HIP UNI 2-3 VIEWS: CPT | Mod: RT

## 2024-05-28 RX ORDER — METHYLPRED ACET/NACL,ISO-OS/PF 40 MG/ML
40 VIAL (ML) INJECTION
Refills: 0 | Status: COMPLETED | OUTPATIENT
Start: 2024-05-28

## 2024-05-28 RX ORDER — LIDOCAINE HYDROCHLORIDE 10 MG/ML
1 INJECTION, SOLUTION INFILTRATION; PERINEURAL
Refills: 0 | Status: COMPLETED | OUTPATIENT
Start: 2024-05-28

## 2024-05-28 RX ADMIN — LIDOCAINE HYDROCHLORIDE 4 %: 10 INJECTION, SOLUTION INFILTRATION; PERINEURAL at 00:00

## 2024-05-28 RX ADMIN — METHYLPREDNISOLONE ACETATE 1 MG/ML: 40 INJECTION, SUSPENSION INTRA-ARTICULAR; INTRALESIONAL; INTRAMUSCULAR; SOFT TISSUE at 00:00

## 2024-05-28 NOTE — DISCUSSION/SUMMARY
[de-identified] : Now s/p right HENRIETTA on 4/1/2024 for follow-up. Overall doing well.  She is experiencing left knee arthritic pain and she was given a injection today.  Previous injections have helped for about 6 months   At this point we have suggested continued exercises.. Appropriate instructions regarding further care, restrictions, and further recovery has been given.    We have also counseled the patient to avoid any dental procedures for the first three months postoperatively. We remain available for any further questions and concerns and instructed patient that we would like to see them if any concerns for infection including fevers, redness, or increased swelling.

## 2024-05-28 NOTE — HISTORY OF PRESENT ILLNESS
[de-identified] : DANIEL MONTERROSO  is an 58 year-old female presents today status post right total hip arthroplasty on 4/1/2024 for followup. The patient is living at home. The patient has maintained weight bearing as tolerated and hip precautions.  She is no longer using an assistive device.  She would like to return to work part-time that she has been working fully remote.  She is also reporting left knee pain.  She does have a history of left knee arthritis and has undergone a cortisone injection in the past.  She finds that the left knee is bothering her more than the right hip.   No fever, chills, or signs of infection. Today, patient does not state any other associated signs or complaints outside of those described. The patient presents for postoperative followup.

## 2024-05-28 NOTE — PHYSICAL EXAM
[de-identified] : General Appearance / Station: Well developed, well nourished, in no acute distress Orientation: Oriented to person, place, and time Gait & Station: Ambulates with assistive device Neurologic: Normal leg sensation Cardiovascular: Warm extremity Lymphatics: No lymphedema  OPERATIVE HIP Skin: incision clean, dry and intact. Well healing. No erythema, warmth or tenderness. Range of motion: Painless internal and external rotation of the hip. Strength: Within Normal Limits. Negative Trendelenburg sign                                                                      Neurovascular Exam: Able to wiggle toes and dorsiflex/ plantarflex ankle. Foot warm, well perfused       SYMPTOMATIC LEFT KNEE: Alignment: Neutral Skin: normal Effusion: none . Quadriceps: normal . Range of motion: symmetrical but painful . PF crepitus: 1+. PF apprehension: none . Patella / Patella Tendon: nontender . Lachman's: negative  Valgus @ 30: negative. Varus @ 30: negative. Posterior drawer: negative. Palpation: TENDER AT MEDIAL JOINT LINE and medial patellar facet Meniscus signs: MEDIAL JOINT LINE  AS [de-identified] : Imaging: AP Pelvis and lateral views of the right hip show satisfactory placement of a right cementless total hip arthroplasty. There are no changes in alignment of hardware and no signs of radiographic failure compared to previous radiographs.

## 2024-05-28 NOTE — PROCEDURE
[de-identified] : Injection: Left knee joint. Indication: Osteoarthritis.   A discussion was had with the patient regarding this procedure and all questions were answered. All risks, benefits and alternatives were discussed. These included but were not limited to bleeding, infection, allergic reaction and reaccumulation of fluid. A timeout was done to ensure correct side and patient agreed to the procedure.  A [place] mayra was created on the skin utilizing a plastic needle cap to mayra the anticipated point of entry. Alcohol was used to clean the skin, and chloraprep was used to sterilize and prep the area in the lateral joint line aspect of the knee. Ethyl chloride spray was then used as a topical anesthetic. A 22-gauge needle was used to inject 4cc 1% lidocaine without epinephrine,  and 1cc of 40mg/ml methylprednisolone into the knee. A sterile bandage was then applied. The patient tolerated the procedure well.

## 2024-05-28 NOTE — RETURN TO WORK/SCHOOL
[FreeTextEntry1] : Patient will return to working in person three days a week and will work remotely two days a week starting July 2, 2024. Will be reevaluated in August 28, 2024. [FreeTextEntry2] : Joe Banks

## 2024-06-06 ENCOUNTER — NON-APPOINTMENT (OUTPATIENT)
Age: 59
End: 2024-06-06

## 2024-06-06 ASSESSMENT — HOOS JR
HOOS JR RAW SCORE: 7
RISING FROM SITTING: MILD
BENDING TO THE FLOOR TO PICK UP OBJECT: MODERATE
GOING UP OR DOWN STAIRS: MILD
LYING IN BED (TURNING OVER, MAINTAINING HIP POSITION): MILD
WALKING ON UNEVEN SURFACE: MILD
SITTING: MILD

## 2024-08-20 ENCOUNTER — APPOINTMENT (OUTPATIENT)
Dept: ORTHOPEDIC SURGERY | Facility: CLINIC | Age: 59
End: 2024-08-20

## 2024-08-20 PROCEDURE — 99214 OFFICE O/P EST MOD 30 MIN: CPT

## 2024-08-20 PROCEDURE — 73502 X-RAY EXAM HIP UNI 2-3 VIEWS: CPT | Mod: RT

## 2024-08-20 PROCEDURE — 73564 X-RAY EXAM KNEE 4 OR MORE: CPT | Mod: LT

## 2024-08-20 NOTE — RETURN TO WORK/SCHOOL
[FreeTextEntry1] : Patient needs to work remotely from home 1 day a week until further reevaluation.    [FreeTextEntry2] : Joe Banks MD

## 2024-08-27 RX ORDER — NAPROXEN 500 MG/1
500 TABLET ORAL
Qty: 30 | Refills: 1 | Status: ACTIVE | COMMUNITY
Start: 2024-08-20 | End: 1900-01-01

## 2025-01-31 ENCOUNTER — NON-APPOINTMENT (OUTPATIENT)
Age: 60
End: 2025-01-31

## 2025-07-29 ENCOUNTER — APPOINTMENT (OUTPATIENT)
Dept: ORTHOPEDIC SURGERY | Facility: CLINIC | Age: 60
End: 2025-07-29
Payer: COMMERCIAL

## 2025-07-29 PROCEDURE — 99214 OFFICE O/P EST MOD 30 MIN: CPT

## 2025-07-29 PROCEDURE — 73502 X-RAY EXAM HIP UNI 2-3 VIEWS: CPT | Mod: LT

## 2025-07-29 PROCEDURE — 73564 X-RAY EXAM KNEE 4 OR MORE: CPT | Mod: LT

## (undated) DEVICE — SOL IRR POUR NS 0.9% 500ML

## (undated) DEVICE — GLV 7.5 PROTEXIS (WHITE)

## (undated) DEVICE — SUT PDS II 0 36" CT-1

## (undated) DEVICE — ELCTR PENCIL SMOKE EVACUATOR COATED PUSH BUTTON 70MM

## (undated) DEVICE — STAPLER SKIN VISI-STAT 35 WIDE

## (undated) DEVICE — SOL IRR POUR H2O 1500ML

## (undated) DEVICE — POSITIONER POSITIONING ROLL  5X17"

## (undated) DEVICE — SPECIMEN CONTAINER PET

## (undated) DEVICE — DRAPE TOP SHEET 53" X 101"

## (undated) DEVICE — DRAPE MAYO STAND 30"

## (undated) DEVICE — DRAPE XL SHEET 77X98"

## (undated) DEVICE — BAG SPONGE COUNTER EZ

## (undated) DEVICE — DRSG SILVERLON ISLAND 4X10" 2X8" PAD

## (undated) DEVICE — SYR ASEPTO

## (undated) DEVICE — SUT MONOCRYL 2-0 36" CT-1 UNDYED

## (undated) DEVICE — DRSG DERMABOND 0.7ML

## (undated) DEVICE — SOLIDIFIER CANN EXPRESS 3K

## (undated) DEVICE — POSITIONER STRAP ARMBOARD VELCRO TS-30

## (undated) DEVICE — SYR LUER LOK 50CC

## (undated) DEVICE — SPECIMEN CONTAINER 4OZ

## (undated) DEVICE — POSITIONER STIRRUP STRAP W SLIP RING 19X3.5"

## (undated) DEVICE — POSITIONER HANA PATIENT CARE KIT POSITION SUPINE

## (undated) DEVICE — DRAPE C ARM 41X140"

## (undated) DEVICE — HOOD FLYTE STRYKER HELMET SHIELD

## (undated) DEVICE — DRAPE SHOWER CURTAIN ISOLATION

## (undated) DEVICE — GLV 8 PROTEXIS (WHITE)

## (undated) DEVICE — SUT VICRYL PLUS 1 27" CP UNDYED

## (undated) DEVICE — CANISTER SUCTION LID GUARD 3000CC

## (undated) DEVICE — POSITIONER FOAM ABDUCTION PILLOW MED (PINK)

## (undated) DEVICE — DRSG SILVERLON ISLAND 4X8" 2X6" PAD

## (undated) DEVICE — DRSG SILVERLON ISLAND 4X6" 2X4" PAD

## (undated) DEVICE — SAW BLADE STRYKER SAGITTAL 21.0X90X1.27MM

## (undated) DEVICE — POSITIONER PATIENT SAFETY STRAP 3X60"

## (undated) DEVICE — SOL IRR BAG NS 0.9% 3000ML

## (undated) DEVICE — DRAPE UTILITY W TAPE 15X26"

## (undated) DEVICE — SUT QUILL PDO 1 30CM T9 DA

## (undated) DEVICE — NDL HYPO SAFE 18G X 1.5" (PINK)

## (undated) DEVICE — WARMING BLANKET UPPER ADULT

## (undated) DEVICE — HANDPIECE INTERPULSE W MULTI TIP

## (undated) DEVICE — POSITIONER FOAM HEAD CRADLE (PINK)

## (undated) DEVICE — VENODYNE/SCD SLEEVE CALF MEDIUM

## (undated) DEVICE — PACK TOTAL HIP

## (undated) DEVICE — GRIPPER MEDENVISION

## (undated) DEVICE — SUT PDS II 1 27" CT-1

## (undated) DEVICE — DRSG STERISTRIPS 0.5 X 4"

## (undated) DEVICE — ELCTR AQUAMANTYS BIPOLAR SEALER 6.0